# Patient Record
Sex: MALE
[De-identification: names, ages, dates, MRNs, and addresses within clinical notes are randomized per-mention and may not be internally consistent; named-entity substitution may affect disease eponyms.]

---

## 2019-10-17 ENCOUNTER — HOSPITAL ENCOUNTER (OUTPATIENT)
Dept: HOSPITAL 72 - SUR | Age: 34
Setting detail: OBSERVATION
Discharge: HOME | End: 2019-10-17
Payer: COMMERCIAL

## 2019-10-17 VITALS — SYSTOLIC BLOOD PRESSURE: 121 MMHG | DIASTOLIC BLOOD PRESSURE: 81 MMHG

## 2019-10-17 VITALS — SYSTOLIC BLOOD PRESSURE: 148 MMHG | DIASTOLIC BLOOD PRESSURE: 92 MMHG

## 2019-10-17 VITALS — DIASTOLIC BLOOD PRESSURE: 80 MMHG | SYSTOLIC BLOOD PRESSURE: 130 MMHG

## 2019-10-17 VITALS — DIASTOLIC BLOOD PRESSURE: 76 MMHG | SYSTOLIC BLOOD PRESSURE: 114 MMHG

## 2019-10-17 VITALS — BODY MASS INDEX: 25.39 KG/M2 | HEIGHT: 66 IN | WEIGHT: 158 LBS

## 2019-10-17 VITALS — SYSTOLIC BLOOD PRESSURE: 131 MMHG | DIASTOLIC BLOOD PRESSURE: 83 MMHG

## 2019-10-17 VITALS — SYSTOLIC BLOOD PRESSURE: 125 MMHG | DIASTOLIC BLOOD PRESSURE: 80 MMHG

## 2019-10-17 VITALS — DIASTOLIC BLOOD PRESSURE: 96 MMHG | SYSTOLIC BLOOD PRESSURE: 152 MMHG

## 2019-10-17 VITALS — DIASTOLIC BLOOD PRESSURE: 76 MMHG | SYSTOLIC BLOOD PRESSURE: 123 MMHG

## 2019-10-17 VITALS — SYSTOLIC BLOOD PRESSURE: 134 MMHG | DIASTOLIC BLOOD PRESSURE: 85 MMHG

## 2019-10-17 VITALS — SYSTOLIC BLOOD PRESSURE: 143 MMHG | DIASTOLIC BLOOD PRESSURE: 98 MMHG

## 2019-10-17 VITALS — DIASTOLIC BLOOD PRESSURE: 88 MMHG | SYSTOLIC BLOOD PRESSURE: 144 MMHG

## 2019-10-17 DIAGNOSIS — Z87.891: ICD-10-CM

## 2019-10-17 DIAGNOSIS — R00.1: ICD-10-CM

## 2019-10-17 DIAGNOSIS — M50.220: ICD-10-CM

## 2019-10-17 DIAGNOSIS — M54.2: Primary | ICD-10-CM

## 2019-10-17 PROCEDURE — 94150 VITAL CAPACITY TEST: CPT

## 2019-10-17 PROCEDURE — 96361 HYDRATE IV INFUSION ADD-ON: CPT

## 2019-10-17 PROCEDURE — 76000 FLUOROSCOPY <1 HR PHYS/QHP: CPT

## 2019-10-17 PROCEDURE — 86900 BLOOD TYPING SEROLOGIC ABO: CPT

## 2019-10-17 PROCEDURE — 86901 BLOOD TYPING SEROLOGIC RH(D): CPT

## 2019-10-17 PROCEDURE — 96360 HYDRATION IV INFUSION INIT: CPT

## 2019-10-17 PROCEDURE — 96365 THER/PROPH/DIAG IV INF INIT: CPT

## 2019-10-17 PROCEDURE — 22856 TOT DISC ARTHRP 1NTRSPC CRV: CPT

## 2019-10-17 PROCEDURE — 87081 CULTURE SCREEN ONLY: CPT

## 2019-10-17 PROCEDURE — 36415 COLL VENOUS BLD VENIPUNCTURE: CPT

## 2019-10-17 PROCEDURE — 97161 PT EVAL LOW COMPLEX 20 MIN: CPT

## 2019-10-17 PROCEDURE — 72040 X-RAY EXAM NECK SPINE 2-3 VW: CPT

## 2019-10-17 PROCEDURE — 94003 VENT MGMT INPAT SUBQ DAY: CPT

## 2019-10-17 PROCEDURE — 86850 RBC ANTIBODY SCREEN: CPT

## 2019-10-17 NOTE — IMMEDIATE POST-OP EVALUATION
Immediate Post-Op Evalulation


Immediate Post-Op Evalulation


Procedure:  ADR C5-6


Date of Evaluation:  Oct 17, 2019


Time of Evaluation:  09:55


IV Fluids:  800 LR


Blood Products:  0


Estimated Blood Loss:  25


Urinary Output:  0


Blood Pressure Systolic:  121


Blood Pressure Diastolic:  88


Pulse Rate:  69


Respiratory Rate:  16


O2 Sat by Pulse Oximetry:  98


Temperature (Fahrenheit):  98


Pain Score (1-10):  2


Nausea:  No


Vomiting:  No


Complications


0


Patient Status:  awake, reacts, patent, extubated, none


Dru Grams Ancef IV


Given Within 1 Hr of Incision:  Yes


Time Given:  07:16











Will Rojas MD Oct 17, 2019 07:04

## 2019-10-17 NOTE — NUR
NURSE NOTES:

Patient tolerated clear liquid diet well, denies N/V, patient reports mild throat 
discomfort. Advanced diet to regular per MD order.

## 2019-10-17 NOTE — ANETHESIA PREOPERATIVE EVAL
Anesthesia Pre-op PMH/ROS


General


Date of Evaluation:  Oct 17, 2019


Time of Evaluation:  06:56


Anesthesiologist:  Bob


ASA Score:  ASA 1


Mallampati Score


Class I : Soft palate, uvula, fauces, pillars visible


Class II: Soft palate, uvula, fauces visible


Class III: Soft palate, base of uvula visible


Class IV: Only hard plate visible


Mallampati Classification:  Class I


Surgeon:  Maikel


Diagnosis:  Neck Pain


Surgical Procedure:  ADR C5-6


Anesthesia History:  none


Family History:  no anesthesia problems


Allergies:  


Coded Allergies:  


     No Known Allergies (Unverified , 10/16/19)


Medications:  see eMAR


Patient NPO?:  Yes


NPO Date:  Oct 16, 2019


NPO Time:  





Anesthesia Pre-op Phys. Exam


Physician Exam





Last Vital Signs








  Date Time  Temp Pulse Resp B/P (MAP) Pulse Ox O2 Delivery O2 Flow Rate FiO2


 


10/17/19 05:47 97.2 52 20 114/76 (89) 100   


 


10/17/19 05:43      Room Air  








Constitutional:  NAD


Neurologic:  CN 2-12 intact


Cardiovascular:  RRR


Respiratory:  CTA


Gastrointestinal:  S/NT/ND





Airway Exam


Mallampati Score:  Class I


MO:  full


ROM:  full


Teeth:  intact





Anesthesia Pre-op A/P


Risk Assessment & Plan


Assessment:


ASA 1


Plan:


GA, SED, GlideScope Go


Status Change Before Surgery:  No





Pre-Antibiotics


Dru Grams Ancef IV


Given Within 1 Hr of Incision:  Yes


Time Given:  09:55











Will Rojas MD Oct 17, 2019 06:36

## 2019-10-17 NOTE — OPERATIVE NOTE - DICTATED
DATE OF OPERATION:  10/17/2019

SURGEON:  Bhargav Ko, PhD, M.D.



ASSISTANT:  RHIANNON Prakash.



ANESTHESIOLOGIST:  Will Rojas M.D.



ANESTHESIA:  General with intubation.



ESTIMATED BLOOD LOSS:  Minimal.



COMPLICATIONS:  None.



POSTOPERATIVE CONDITION:  Good/stable.



SPECIMEN:  Disk fragment of C5-C6, to pathology.



OPERATIVE PROCEDURE:  Artificial disk replacement, Medtronic, C5-C6, with

fluoroscopic guidance interpreted by surgeon and SSEP monitoring.



DESCRIPTION OF PROCEDURE:  The patient was brought to the operating room

and in the supine position, general anesthesia with intubation was

induced.  IV antibiotics and IV Decadron were administered 30 minutes

prior to incision time.



Lateral radiographs were obtained with markers in place on the

contralateral aspect of the cervical spine demonstrating the correct level

for incision placement.  Under sterile pen use, the incision level was

marked on the left side of the neck.  Fluoroscopic guidance was utilized

with markers to determine from the outside without penetration of the skin

for level of incision placement.  Level was appropriately marked on the

left lateral aspect of the neck.  Anterior cervical spine was sterilely

prepped and draped free in usual sterile fashion.



A transverse left incision was sharply placed at the appropriate

interval between dermis and epidermis.  Electrocautery dissection was

carried through the subcutaneous tissue to the level of the platysmas

muscle, which was identified, isolated, and transected in line with the

incision.  Blunt dissection was sequentially carried between the deep

cervical and pretracheal fascia to the midline between the right and left

longus colli muscles.  The disk space was identified under high-power

magnification.  A spinal needle bent at 90-degree angles to avoid

penetration greater than 3 mm was placed into the disk space and a

cross-table image was obtained demonstrating the correct level for the

dissection.  Level was marked.  Needle removed.  Subperiosteal dissection

of longus colli muscles not exceeding 3 mm in medial and lateral extent.

Retractors placed.  Cephalad and caudad pins with traction placed as well.

Annulotomy performed with diskectomy to the level of the posterior

longitudinal ligament extending from the right to the left joint of

Luschka.  The anterior lip resections at the inferior aspect of C5 was

dissected with Midas Bi bur dissection minimal.  Posterior longitudinal

ligament was resected under high-power magnification.  No dural tears or

leaks occurred at anytime during the procedure.  SSEP monitoring stable at

all times.



Appropriate trials were utilized with the distraction on the neck

removed.  After the appropriate trial was determined, AP and lateral

fluoroscopic images were obtained demonstrating correct depth, height, and

midline.  Appropriate cutters were utilized to follow the implantation of

the appropriate artificial disk.  Fit was excellent.  AP and lateral

fluoroscopic images recorded demonstrating midline and excellent position

anteroposterior.  Wound was irrigated with antibiotic-containing saline.

All pins had been removed.  Bleeding bone was cauterized with application

of sterile wax.  After exploration under high-power magnification, with no

obvious excoriation or laceration of vital structures, FloSeal was

applied.  Reapproximation of platysmas muscle.  Reapproximation with

inverted interrupted sutures of Vicryl suture material of dermis and

epidermis.  Surgical strips followed with application of sterile bandage.

The patient was awakened, extubated in the operating room, and transported

to postop recovery in good stable condition.









  ______________________________________________

  Bhargav Ko M.D.





DR:  Sweta

D:  10/17/2019 09:31

T:  10/17/2019 15:54

JOB#:  [id number]

CC:
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
## 2019-10-17 NOTE — NUR
NURSE NOTES:

Patient discharged without distress. Provided patient with discharge education/handouts, 
reviewed with patient and patient verbalized understanding of provided education. Patient to 
follow up with Dr. Ko on 11/4/19. Patient educated not to drive until cleared by Dr. Ko, patient verbalized understanding. IV removed intact. Patient escorted off unit by 
CNA via wheelchair, accompanied by patient's wife to private vehicle.

## 2019-10-17 NOTE — NUR
NURSE NOTES:

Patient walked with physical therapy and tolerated well, ambulated steadily. Patient cleared 
for discharge by physical therapist Wade. Pain well managed, patient eating well. Will 
discharge per MD order.

## 2019-10-17 NOTE — NUR
P.T Note:

P.T evaluation and P.T instructions provided /completed on spinal 

precautions and  proper proper mechanics in performing ADl/functional  

mobility tasks with good verbalization of understanding and return 

demonstration. Pt functioning safely and independently within the 

surgical guidelines. Skilled P.T services no longer needed at this time. 


-------------------------------------------------------------------------------

Addendum: 10/17/19 at 1549 by PIERRE JOHNSTON PT

-------------------------------------------------------------------------------

Amended: Links added.

## 2019-10-17 NOTE — NUR
NURSE NOTES:

Patient arrived to unit via bed, accompanied by RN. Received report from Katlyn SALGUERO. Patient 
is awake but drowsy, no acute distress noted, reporting no pain at this time, just mild 
discomfort. On 2L NC. VS assessed and stable. Neuro check assessed intact. Anterior cervical 
dressing clean, dry, intact. Left hand IV intact, patent. SCD's in place. Patient oriented 
to room. Side rails upx3, bed low and locked, call light in reach. Will continue to monitor.

## 2019-10-17 NOTE — DIAGNOSTIC IMAGING REPORT
INDICATION:  Pain, intraoperative

 

TECHNIQUE: Intraoperative imaging

 

Fluoroscopy time: 20.6 seconds

Total dose: 0.45324 mGym2

Total number of images: 4

 

COMPARISON: None

 

FINDINGS: Intraoperative images demonstrate surgical tool projected anterior to the

C5-6 disc. Subsequent images document placement of a disc prosthesis at C5-6. This

appears well aligned.

 

IMPRESSION: Intraoperative imaging, as described

## 2019-10-18 NOTE — 48 HOUR POST ANESTHESIA EVAL
Post Anesthesia Evaluation


Procedure:  ADR C5-6


Date of Evaluation:  Oct 17, 2019


Airway:  patent


Nausea:  No


Vomiting:  No


Hydration Status:  adequate


Cardiopulmonary Status:


at baseline


Mental Status/LOC:  patient returned to baseline


Post-Anesthesia Complications:


0


Follow-up care needed:  ready to discharge











Stefania Massey MD Oct 18, 2019 10:05
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
